# Patient Record
(demographics unavailable — no encounter records)

---

## 2018-06-08 NOTE — ER DOCUMENT REPORT
HPI





- HPI


Pain Level: 3


Context: 





Patient is a 11-year-old female with a past medical history significant for 

autism who presents emergency department the chief complaint of head injury.  

Mom states that she was excited about dinner and turned around lost her balance 

and hit her forehead on a chair.  She states that she has not days at all 

denies any LOC, dizziness, altered mental status or confusion.  Has been 

tolerating p.o. immediately after the incident.  Bleeding is now controlled.  Up

-to-date on vaccines.





- REPRODUCTIVE


Reproductive: DENIES: Pregnant:





Past Medical History





- Social History


Smoking Status: Never Smoker


Chew tobacco use (# tins/day): No


Frequency of alcohol use: None


Drug Abuse: None


Family History: Reviewed & Not Pertinent


Patient has suicidal ideation: No


Patient has homicidal ideation: No


Neurological Medical History: Reports: Hx Seizures - epilepsy


Renal/ Medical History: Denies: Hx Peritoneal Dialysis





- Immunizations


Immunizations up to date: Yes


Hx Diphtheria, Pertussis, Tetanus Vaccination: Yes





Vertical Provider Document





- CONSTITUTIONAL


Agree With Documented VS: Yes


Notes: 





GENERAL: appears well, alert, attentiveness normal, consolable, good eye contact

, NAD


HEENT: NC, 1 cm superficial laceration on the right forehead without active 

bleeding, pale conjunctiva,  MMM


RESP: no respiratory distress, chest nontender, normal breath sounds evidence 

of wheezing, rhonchi, rales


CARDIAC: Regular rate and rhythm.  S1 and S2 appreciated no evidence, murmur, 

rub.  Brachial pulse normal, normal cap refill


EXTREMITIES: Normal inspection, nontender, no evidence of edema, normal range 

of motion and strength, normal temperature.


NEURO: neuro grossly intact. spontaneous eye opening, age appropriate verbal 

and spontaneous movements


SKIN: warm , dry, normal color, elastic without irregularities





- INFECTION CONTROL


TRAVEL OUTSIDE OF THE [place] IN LAST 30 DAYS: No





Course





- Re-evaluation


Re-evalutation: 





06/08/18 20:23


Presentation of head trauma without vomiting, evidence of basilar skull fracture

, history of high-risk mechanism (Motor vehicle crash with patient ejection, 

death of another passenger, or rollover; pedestrian or bicyclist without helmet 

struck by a motorized vehicle; falls of more than 1.5m/5ft; head struck by a 

high-impact object), severe headache,  focal neurologic deficits, or altered 

mental status with a GCS of 15 at time of arrival, in an otherwise very well-

appearing child.  Child is acting normally per the parents. Child is PECARN 

category "No CT recommended" with risk for clinically significant injury of 

less than 0.05%. Parents are in agreement with avoiding imaging at this time.  

Wound was irrigated and closed with Dermabond.  Will discharge at this time 

with return precautions and follow-up recommendations. Parents are in agreement 

with this plan and have verbalized understanding of return precautions.





- Vital Signs


Vital signs: 


 











Temp Pulse Resp BP Pulse Ox


 


 97.9 F   114 H  28 H  139/83   94 


 


 06/08/18 18:48  06/08/18 18:48  06/08/18 18:48  06/08/18 18:48  06/08/18 18:48














Procedures





- Laceration/Wound Repair


  ** Right Face


Wound length (cm): 1


Wound's Depth, Shape: Superficial


Laceration pre-procedure: Other - saline


Wound explored: Clean


Wound Repaired With: Dermabond





Discharge





- Discharge


Clinical Impression: 


 Laceration





Head injury


Qualifiers:


 Encounter type: initial encounter Qualified Code(s): S09.90XA - Unspecified 

injury of head, initial encounter





Condition: Good


Disposition: HOME, SELF-CARE


Instructions:  Head Injury, Child ([facility])


Additional Instructions: 


The wound has been closed with glue.  Please do not pick at the at the wound.  

Do not cover it with any kind of antibiotic ointment as this can cause the glue 

to loosen.  Return immediately if you develop spreading redness around the wound

, pus from the wound, worsening pain, or a fever of >100.4. Keep the area clean 

and dry. 


Referrals: 


TONNY CUMMINGS FNP [Primary Care Provider] - Follow up in 3-5 days